# Patient Record
Sex: MALE
[De-identification: names, ages, dates, MRNs, and addresses within clinical notes are randomized per-mention and may not be internally consistent; named-entity substitution may affect disease eponyms.]

---

## 2024-06-05 PROBLEM — Z00.00 ENCOUNTER FOR PREVENTIVE HEALTH EXAMINATION: Status: ACTIVE | Noted: 2024-06-05

## 2024-06-07 ENCOUNTER — APPOINTMENT (OUTPATIENT)
Dept: INTERNAL MEDICINE | Facility: CLINIC | Age: 64
End: 2024-06-07
Payer: COMMERCIAL

## 2024-06-07 VITALS
HEART RATE: 75 BPM | HEIGHT: 64 IN | SYSTOLIC BLOOD PRESSURE: 112 MMHG | BODY MASS INDEX: 26.29 KG/M2 | OXYGEN SATURATION: 97 % | TEMPERATURE: 98.2 F | WEIGHT: 154 LBS | DIASTOLIC BLOOD PRESSURE: 64 MMHG

## 2024-06-07 DIAGNOSIS — H43.399 OTHER VITREOUS OPACITIES, UNSPECIFIED EYE: ICD-10-CM

## 2024-06-07 PROCEDURE — G2211 COMPLEX E/M VISIT ADD ON: CPT | Mod: NC,1L

## 2024-06-07 PROCEDURE — 99203 OFFICE O/P NEW LOW 30 MIN: CPT | Mod: 25

## 2024-06-07 NOTE — HISTORY OF PRESENT ILLNESS
[FreeTextEntry1] : Patient is here with his daughter as a . He is Chinese speaking. He is complaining of floaters in the left eye. He also wants his cholesterol checked.  [de-identified] : Patient developed floaters in the left eye 2 weeks ago.  He also wants his cholesterol checked. He is not interested in most preventive measures such as colorectal screening or lung cancer screening. He would like prostate cancer screening. He feels well otherwise. He takes no medications.

## 2024-06-07 NOTE — PHYSICAL EXAM
[Normal] : normal rate, regular rhythm, normal S1 and S2 and no murmur heard [TextEntry] : Patient refuses full physical exam

## 2024-06-07 NOTE — ASSESSMENT
[FreeTextEntry1] : Refer to optho for floaters. Advised patient that could be a symptom of retinal disease and he should be seen within a week. I asked patient to call office if he has difficulty getting this appt.  Will check Chol, CMP and PSA.  Patient refused full exam and most preventive measures. Will call with results and emphasize need to get colorectal screening, lung cancer screening and appropriate vaccines.

## 2024-06-10 DIAGNOSIS — E78.5 HYPERLIPIDEMIA, UNSPECIFIED: ICD-10-CM

## 2024-06-10 RX ORDER — ATORVASTATIN CALCIUM 20 MG/1
20 TABLET, FILM COATED ORAL DAILY
Qty: 90 | Refills: 3 | Status: ACTIVE | COMMUNITY
Start: 2024-06-10 | End: 1900-01-01

## 2024-06-11 LAB
ALBUMIN SERPL ELPH-MCNC: 4.9 G/DL
ALP BLD-CCNC: 71 U/L
ALT SERPL-CCNC: 17 U/L
ANION GAP SERPL CALC-SCNC: 13 MMOL/L
AST SERPL-CCNC: 18 U/L
BILIRUB SERPL-MCNC: 1.4 MG/DL
BUN SERPL-MCNC: 21 MG/DL
CALCIUM SERPL-MCNC: 9.3 MG/DL
CHLORIDE SERPL-SCNC: 107 MMOL/L
CHOLEST SERPL-MCNC: 224 MG/DL
CO2 SERPL-SCNC: 21 MMOL/L
CREAT SERPL-MCNC: 0.9 MG/DL
DRE ABNORMAL+AA+ARIS: 27 %
DRE ABNORMAL+AFRICAN ANCESTRY: 15 %
DRE ABNORMAL+ARIS: 21 %
DRE ABNORMAL+FAM HIST+AA+ARIS: 37 %
DRE ABNORMAL+FAM HIST+AA: 22 %
DRE ABNORMAL+FAM HIST+ARIS: 31 %
DRE ABNORMAL+FAM HIST: 17 %
DRE ABNORMAL: 11 %
DRE NORMAL+AA+ARIS: 14 %
DRE NORMAL+AFRICAN ANCESTRY: 7 %
DRE NORMAL+ARIS: 10 %
DRE NORMAL+FAM HIST+AA+ARIS: 21 %
DRE NORMAL+FAM HIST+AA: 11 %
DRE NORMAL+FAM HIST+ARIS: 16 %
DRE NORMAL+FAM HIST: 8 %
DRE NORMAL: 5 %
EGFR: 95 ML/MIN/1.73M2
GLUCOSE SERPL-MCNC: 108 MG/DL
HDLC SERPL-MCNC: 44 MG/DL
LDLC SERPL CALC-MCNC: 130 MG/DL
NONHDLC SERPL-MCNC: 180 MG/DL
POTASSIUM SERPL-SCNC: 4.2 MMOL/L
PROT SERPL-MCNC: 7.1 G/DL
PSA RISK STRATIFICATION INTERP: NORMAL
PSA RISK STRATIFICATION: NORMAL
PSA SERPL-MCNC: 1.1 NG/ML
SODIUM SERPL-SCNC: 141 MMOL/L
TRIGL SERPL-MCNC: 284 MG/DL

## 2024-06-13 ENCOUNTER — TRANSCRIPTION ENCOUNTER (OUTPATIENT)
Age: 64
End: 2024-06-13

## 2024-06-13 ENCOUNTER — NON-APPOINTMENT (OUTPATIENT)
Age: 64
End: 2024-06-13

## 2024-06-13 ENCOUNTER — APPOINTMENT (OUTPATIENT)
Dept: OPHTHALMOLOGY | Facility: CLINIC | Age: 64
End: 2024-06-13
Payer: COMMERCIAL

## 2024-06-13 PROCEDURE — 92004 COMPRE OPH EXAM NEW PT 1/>: CPT

## 2024-06-13 PROCEDURE — 92250 FUNDUS PHOTOGRAPHY W/I&R: CPT
